# Patient Record
Sex: FEMALE | URBAN - METROPOLITAN AREA
[De-identification: names, ages, dates, MRNs, and addresses within clinical notes are randomized per-mention and may not be internally consistent; named-entity substitution may affect disease eponyms.]

---

## 2021-11-22 ENCOUNTER — HOSPITAL ENCOUNTER (OUTPATIENT)
Age: 76
Discharge: HOME | End: 2021-11-24

## 2021-11-22 PROCEDURE — 87081 CULTURE SCREEN ONLY: CPT

## 2021-11-24 LAB — ORGANISM: ABNORMAL

## 2025-04-04 ENCOUNTER — TRANSCRIBE ORDERS (OUTPATIENT)
Dept: ADMINISTRATIVE | Age: 80
End: 2025-04-04

## 2025-04-04 DIAGNOSIS — Z12.31 ENCOUNTER FOR SCREENING MAMMOGRAM FOR MALIGNANT NEOPLASM OF BREAST: Primary | ICD-10-CM

## 2025-06-19 ENCOUNTER — APPOINTMENT (OUTPATIENT)
Dept: RADIOLOGY | Facility: HOSPITAL | Age: 80
End: 2025-06-19
Payer: MEDICARE

## 2025-06-19 ENCOUNTER — HOSPITAL ENCOUNTER (OUTPATIENT)
Facility: HOSPITAL | Age: 80
Setting detail: OBSERVATION
Discharge: HOME | End: 2025-06-20
Attending: STUDENT IN AN ORGANIZED HEALTH CARE EDUCATION/TRAINING PROGRAM | Admitting: INTERNAL MEDICINE
Payer: MEDICARE

## 2025-06-19 ENCOUNTER — APPOINTMENT (OUTPATIENT)
Dept: CARDIOLOGY | Facility: HOSPITAL | Age: 80
End: 2025-06-19
Payer: MEDICARE

## 2025-06-19 DIAGNOSIS — R55 SYNCOPE, UNSPECIFIED SYNCOPE TYPE: Primary | ICD-10-CM

## 2025-06-19 DIAGNOSIS — R60.0 LEG EDEMA, LEFT: ICD-10-CM

## 2025-06-19 DIAGNOSIS — R55 SYNCOPE AND COLLAPSE: ICD-10-CM

## 2025-06-19 DIAGNOSIS — J34.9 DISORDER OF MAXILLARY SINUS: ICD-10-CM

## 2025-06-19 LAB
ALBUMIN SERPL BCP-MCNC: 4.5 G/DL (ref 3.4–5)
ALP SERPL-CCNC: 78 U/L (ref 33–136)
ALT SERPL W P-5'-P-CCNC: 11 U/L (ref 7–45)
ANION GAP SERPL CALC-SCNC: 14 MMOL/L (ref 10–20)
APPEARANCE UR: CLEAR
AST SERPL W P-5'-P-CCNC: 17 U/L (ref 9–39)
BASOPHILS # BLD AUTO: 0.03 X10*3/UL (ref 0–0.1)
BASOPHILS NFR BLD AUTO: 0.3 %
BILIRUB SERPL-MCNC: 0.4 MG/DL (ref 0–1.2)
BILIRUB UR STRIP.AUTO-MCNC: NEGATIVE MG/DL
BUN SERPL-MCNC: 21 MG/DL (ref 6–23)
CALCIUM SERPL-MCNC: 9.4 MG/DL (ref 8.6–10.3)
CARDIAC TROPONIN I PNL SERPL HS: 4 NG/L (ref 0–13)
CARDIAC TROPONIN I PNL SERPL HS: 5 NG/L (ref 0–13)
CHLORIDE SERPL-SCNC: 96 MMOL/L (ref 98–107)
CO2 SERPL-SCNC: 29 MMOL/L (ref 21–32)
COLOR UR: COLORLESS
CREAT SERPL-MCNC: 0.82 MG/DL (ref 0.5–1.05)
D DIMER PPP FEU-MCNC: 939 NG/ML FEU
EGFRCR SERPLBLD CKD-EPI 2021: 72 ML/MIN/1.73M*2
EOSINOPHIL # BLD AUTO: 0.06 X10*3/UL (ref 0–0.4)
EOSINOPHIL NFR BLD AUTO: 0.7 %
ERYTHROCYTE [DISTWIDTH] IN BLOOD BY AUTOMATED COUNT: 13.5 % (ref 11.5–14.5)
GLUCOSE SERPL-MCNC: 108 MG/DL (ref 74–99)
GLUCOSE UR STRIP.AUTO-MCNC: NORMAL MG/DL
HCT VFR BLD AUTO: 38.1 % (ref 36–46)
HGB BLD-MCNC: 12.5 G/DL (ref 12–16)
HOLD SPECIMEN: NORMAL
IMM GRANULOCYTES # BLD AUTO: 0.03 X10*3/UL (ref 0–0.5)
IMM GRANULOCYTES NFR BLD AUTO: 0.3 % (ref 0–0.9)
KETONES UR STRIP.AUTO-MCNC: NEGATIVE MG/DL
LEUKOCYTE ESTERASE UR QL STRIP.AUTO: NEGATIVE
LYMPHOCYTES # BLD AUTO: 1.13 X10*3/UL (ref 0.8–3)
LYMPHOCYTES NFR BLD AUTO: 12.8 %
MAGNESIUM SERPL-MCNC: 1.88 MG/DL (ref 1.6–2.4)
MCH RBC QN AUTO: 29.4 PG (ref 26–34)
MCHC RBC AUTO-ENTMCNC: 32.8 G/DL (ref 32–36)
MCV RBC AUTO: 90 FL (ref 80–100)
MONOCYTES # BLD AUTO: 0.55 X10*3/UL (ref 0.05–0.8)
MONOCYTES NFR BLD AUTO: 6.2 %
NEUTROPHILS # BLD AUTO: 7.05 X10*3/UL (ref 1.6–5.5)
NEUTROPHILS NFR BLD AUTO: 79.7 %
NITRITE UR QL STRIP.AUTO: NEGATIVE
NRBC BLD-RTO: 0 /100 WBCS (ref 0–0)
PH UR STRIP.AUTO: 5.5 [PH]
PLATELET # BLD AUTO: 217 X10*3/UL (ref 150–450)
POTASSIUM SERPL-SCNC: 3.6 MMOL/L (ref 3.5–5.3)
PROT SERPL-MCNC: 7.5 G/DL (ref 6.4–8.2)
PROT UR STRIP.AUTO-MCNC: NEGATIVE MG/DL
RBC # BLD AUTO: 4.25 X10*6/UL (ref 4–5.2)
RBC # UR STRIP.AUTO: ABNORMAL MG/DL
RBC #/AREA URNS AUTO: NORMAL /HPF
SODIUM SERPL-SCNC: 135 MMOL/L (ref 136–145)
SP GR UR STRIP.AUTO: 1.01
TSH SERPL-ACNC: 1.91 MIU/L (ref 0.44–3.98)
UROBILINOGEN UR STRIP.AUTO-MCNC: NORMAL MG/DL
WBC # BLD AUTO: 8.9 X10*3/UL (ref 4.4–11.3)
WBC #/AREA URNS AUTO: NORMAL /HPF

## 2025-06-19 PROCEDURE — 99285 EMERGENCY DEPT VISIT HI MDM: CPT | Mod: 25 | Performed by: STUDENT IN AN ORGANIZED HEALTH CARE EDUCATION/TRAINING PROGRAM

## 2025-06-19 PROCEDURE — 36415 COLL VENOUS BLD VENIPUNCTURE: CPT

## 2025-06-19 PROCEDURE — G0378 HOSPITAL OBSERVATION PER HR: HCPCS

## 2025-06-19 PROCEDURE — 84443 ASSAY THYROID STIM HORMONE: CPT

## 2025-06-19 PROCEDURE — 84484 ASSAY OF TROPONIN QUANT: CPT

## 2025-06-19 PROCEDURE — 71046 X-RAY EXAM CHEST 2 VIEWS: CPT

## 2025-06-19 PROCEDURE — 85379 FIBRIN DEGRADATION QUANT: CPT | Performed by: NURSE PRACTITIONER

## 2025-06-19 PROCEDURE — 2500000004 HC RX 250 GENERAL PHARMACY W/ HCPCS (ALT 636 FOR OP/ED): Performed by: NURSE PRACTITIONER

## 2025-06-19 PROCEDURE — 93005 ELECTROCARDIOGRAM TRACING: CPT

## 2025-06-19 PROCEDURE — 85025 COMPLETE CBC W/AUTO DIFF WBC: CPT

## 2025-06-19 PROCEDURE — 96372 THER/PROPH/DIAG INJ SC/IM: CPT | Performed by: NURSE PRACTITIONER

## 2025-06-19 PROCEDURE — 81001 URINALYSIS AUTO W/SCOPE: CPT

## 2025-06-19 PROCEDURE — 80053 COMPREHEN METABOLIC PANEL: CPT

## 2025-06-19 PROCEDURE — 72125 CT NECK SPINE W/O DYE: CPT | Performed by: STUDENT IN AN ORGANIZED HEALTH CARE EDUCATION/TRAINING PROGRAM

## 2025-06-19 PROCEDURE — 83735 ASSAY OF MAGNESIUM: CPT

## 2025-06-19 PROCEDURE — 72125 CT NECK SPINE W/O DYE: CPT

## 2025-06-19 PROCEDURE — 70450 CT HEAD/BRAIN W/O DYE: CPT | Performed by: STUDENT IN AN ORGANIZED HEALTH CARE EDUCATION/TRAINING PROGRAM

## 2025-06-19 PROCEDURE — 71046 X-RAY EXAM CHEST 2 VIEWS: CPT | Performed by: RADIOLOGY

## 2025-06-19 PROCEDURE — 99223 1ST HOSP IP/OBS HIGH 75: CPT | Performed by: NURSE PRACTITIONER

## 2025-06-19 PROCEDURE — 2500000001 HC RX 250 WO HCPCS SELF ADMINISTERED DRUGS (ALT 637 FOR MEDICARE OP): Performed by: NURSE PRACTITIONER

## 2025-06-19 PROCEDURE — 70450 CT HEAD/BRAIN W/O DYE: CPT

## 2025-06-19 RX ORDER — ACETAMINOPHEN 325 MG/1
650 TABLET ORAL EVERY 4 HOURS PRN
Status: DISCONTINUED | OUTPATIENT
Start: 2025-06-19 | End: 2025-06-20 | Stop reason: HOSPADM

## 2025-06-19 RX ORDER — OMEPRAZOLE 40 MG/1
40 CAPSULE, DELAYED RELEASE ORAL
COMMUNITY

## 2025-06-19 RX ORDER — PANTOPRAZOLE SODIUM 40 MG/1
40 TABLET, DELAYED RELEASE ORAL
Status: DISCONTINUED | OUTPATIENT
Start: 2025-06-20 | End: 2025-06-20 | Stop reason: HOSPADM

## 2025-06-19 RX ORDER — SERTRALINE HYDROCHLORIDE 50 MG/1
75 TABLET, FILM COATED ORAL NIGHTLY
COMMUNITY

## 2025-06-19 RX ORDER — SERTRALINE HYDROCHLORIDE 50 MG/1
75 TABLET, FILM COATED ORAL NIGHTLY
Status: DISCONTINUED | OUTPATIENT
Start: 2025-06-20 | End: 2025-06-20 | Stop reason: HOSPADM

## 2025-06-19 RX ORDER — FOSINOPRIL SODIUM AND HYDROCHLOROTHIAZIDE 10; 12.5 MG/1; MG/1
1 TABLET ORAL DAILY
COMMUNITY

## 2025-06-19 RX ORDER — TALC
3 POWDER (GRAM) TOPICAL NIGHTLY PRN
Status: DISCONTINUED | OUTPATIENT
Start: 2025-06-19 | End: 2025-06-20 | Stop reason: HOSPADM

## 2025-06-19 RX ORDER — SODIUM CHLORIDE 9 MG/ML
100 INJECTION, SOLUTION INTRAVENOUS CONTINUOUS
Status: DISCONTINUED | OUTPATIENT
Start: 2025-06-19 | End: 2025-06-20 | Stop reason: HOSPADM

## 2025-06-19 RX ORDER — ACETAMINOPHEN 160 MG/5ML
650 SOLUTION ORAL EVERY 4 HOURS PRN
Status: DISCONTINUED | OUTPATIENT
Start: 2025-06-19 | End: 2025-06-20 | Stop reason: HOSPADM

## 2025-06-19 RX ORDER — LATANOPROST 50 UG/ML
1 SOLUTION/ DROPS OPHTHALMIC NIGHTLY
Status: DISCONTINUED | OUTPATIENT
Start: 2025-06-20 | End: 2025-06-20 | Stop reason: HOSPADM

## 2025-06-19 RX ORDER — FOSINOPRIL SODIUM AND HYDROCHLOROTHIAZIDE 10; 12.5 MG/1; MG/1
1 TABLET ORAL DAILY
Status: DISCONTINUED | OUTPATIENT
Start: 2025-06-20 | End: 2025-06-19 | Stop reason: CLARIF

## 2025-06-19 RX ORDER — ACETAMINOPHEN 650 MG/1
650 SUPPOSITORY RECTAL EVERY 4 HOURS PRN
Status: DISCONTINUED | OUTPATIENT
Start: 2025-06-19 | End: 2025-06-20 | Stop reason: HOSPADM

## 2025-06-19 RX ORDER — SERTRALINE HYDROCHLORIDE 50 MG/1
75 TABLET, FILM COATED ORAL NIGHTLY
Status: DISCONTINUED | OUTPATIENT
Start: 2025-06-20 | End: 2025-06-19

## 2025-06-19 RX ORDER — ENOXAPARIN SODIUM 100 MG/ML
40 INJECTION SUBCUTANEOUS EVERY 24 HOURS
Status: DISCONTINUED | OUTPATIENT
Start: 2025-06-19 | End: 2025-06-20 | Stop reason: HOSPADM

## 2025-06-19 RX ORDER — POLYETHYLENE GLYCOL 3350 17 G/17G
17 POWDER, FOR SOLUTION ORAL DAILY
Status: DISCONTINUED | OUTPATIENT
Start: 2025-06-19 | End: 2025-06-20 | Stop reason: HOSPADM

## 2025-06-19 RX ORDER — LATANOPROST 50 UG/ML
1 SOLUTION/ DROPS OPHTHALMIC NIGHTLY
COMMUNITY

## 2025-06-19 RX ADMIN — SODIUM CHLORIDE 100 ML/HR: 0.9 INJECTION, SOLUTION INTRAVENOUS at 20:47

## 2025-06-19 RX ADMIN — ENOXAPARIN SODIUM 40 MG: 40 INJECTION SUBCUTANEOUS at 20:47

## 2025-06-19 RX ADMIN — ACETAMINOPHEN 650 MG: 325 TABLET, FILM COATED ORAL at 20:47

## 2025-06-19 SDOH — SOCIAL STABILITY: SOCIAL INSECURITY: WITHIN THE LAST YEAR, HAVE YOU BEEN AFRAID OF YOUR PARTNER OR EX-PARTNER?: NO

## 2025-06-19 SDOH — SOCIAL STABILITY: SOCIAL INSECURITY: HAS ANYONE EVER THREATENED TO HURT YOUR FAMILY OR YOUR PETS?: NO

## 2025-06-19 SDOH — ECONOMIC STABILITY: INCOME INSECURITY: IN THE PAST 12 MONTHS HAS THE ELECTRIC, GAS, OIL, OR WATER COMPANY THREATENED TO SHUT OFF SERVICES IN YOUR HOME?: NO

## 2025-06-19 SDOH — ECONOMIC STABILITY: FOOD INSECURITY: HOW HARD IS IT FOR YOU TO PAY FOR THE VERY BASICS LIKE FOOD, HOUSING, MEDICAL CARE, AND HEATING?: NOT HARD AT ALL

## 2025-06-19 SDOH — ECONOMIC STABILITY: HOUSING INSECURITY: IN THE LAST 12 MONTHS, WAS THERE A TIME WHEN YOU WERE NOT ABLE TO PAY THE MORTGAGE OR RENT ON TIME?: NO

## 2025-06-19 SDOH — ECONOMIC STABILITY: HOUSING INSECURITY: AT ANY TIME IN THE PAST 12 MONTHS, WERE YOU HOMELESS OR LIVING IN A SHELTER (INCLUDING NOW)?: NO

## 2025-06-19 SDOH — ECONOMIC STABILITY: FOOD INSECURITY: WITHIN THE PAST 12 MONTHS, YOU WORRIED THAT YOUR FOOD WOULD RUN OUT BEFORE YOU GOT THE MONEY TO BUY MORE.: NEVER TRUE

## 2025-06-19 SDOH — SOCIAL STABILITY: SOCIAL INSECURITY
WITHIN THE LAST YEAR, HAVE YOU BEEN KICKED, HIT, SLAPPED, OR OTHERWISE PHYSICALLY HURT BY YOUR PARTNER OR EX-PARTNER?: NO

## 2025-06-19 SDOH — ECONOMIC STABILITY: TRANSPORTATION INSECURITY: IN THE PAST 12 MONTHS, HAS LACK OF TRANSPORTATION KEPT YOU FROM MEDICAL APPOINTMENTS OR FROM GETTING MEDICATIONS?: NO

## 2025-06-19 SDOH — SOCIAL STABILITY: SOCIAL INSECURITY: DO YOU FEEL ANYONE HAS EXPLOITED OR TAKEN ADVANTAGE OF YOU FINANCIALLY OR OF YOUR PERSONAL PROPERTY?: NO

## 2025-06-19 SDOH — SOCIAL STABILITY: SOCIAL INSECURITY: WITHIN THE LAST YEAR, HAVE YOU BEEN HUMILIATED OR EMOTIONALLY ABUSED IN OTHER WAYS BY YOUR PARTNER OR EX-PARTNER?: NO

## 2025-06-19 SDOH — SOCIAL STABILITY: SOCIAL INSECURITY
WITHIN THE LAST YEAR, HAVE YOU BEEN RAPED OR FORCED TO HAVE ANY KIND OF SEXUAL ACTIVITY BY YOUR PARTNER OR EX-PARTNER?: NO

## 2025-06-19 SDOH — ECONOMIC STABILITY: HOUSING INSECURITY: IN THE PAST 12 MONTHS, HOW MANY TIMES HAVE YOU MOVED WHERE YOU WERE LIVING?: 0

## 2025-06-19 SDOH — ECONOMIC STABILITY: FOOD INSECURITY: WITHIN THE PAST 12 MONTHS, THE FOOD YOU BOUGHT JUST DIDN'T LAST AND YOU DIDN'T HAVE MONEY TO GET MORE.: NEVER TRUE

## 2025-06-19 SDOH — SOCIAL STABILITY: SOCIAL INSECURITY: ARE THERE ANY APPARENT SIGNS OF INJURIES/BEHAVIORS THAT COULD BE RELATED TO ABUSE/NEGLECT?: NO

## 2025-06-19 SDOH — SOCIAL STABILITY: SOCIAL INSECURITY: DOES ANYONE TRY TO KEEP YOU FROM HAVING/CONTACTING OTHER FRIENDS OR DOING THINGS OUTSIDE YOUR HOME?: NO

## 2025-06-19 SDOH — SOCIAL STABILITY: SOCIAL INSECURITY: HAVE YOU HAD THOUGHTS OF HARMING ANYONE ELSE?: NO

## 2025-06-19 SDOH — SOCIAL STABILITY: SOCIAL INSECURITY: DO YOU FEEL UNSAFE GOING BACK TO THE PLACE WHERE YOU ARE LIVING?: NO

## 2025-06-19 SDOH — SOCIAL STABILITY: SOCIAL INSECURITY: WERE YOU ABLE TO COMPLETE ALL THE BEHAVIORAL HEALTH SCREENINGS?: YES

## 2025-06-19 SDOH — SOCIAL STABILITY: SOCIAL INSECURITY: ABUSE: ADULT

## 2025-06-19 SDOH — SOCIAL STABILITY: SOCIAL INSECURITY: HAVE YOU HAD ANY THOUGHTS OF HARMING ANYONE ELSE?: NO

## 2025-06-19 SDOH — SOCIAL STABILITY: SOCIAL INSECURITY: ARE YOU OR HAVE YOU BEEN THREATENED OR ABUSED PHYSICALLY, EMOTIONALLY, OR SEXUALLY BY ANYONE?: NO

## 2025-06-19 ASSESSMENT — COGNITIVE AND FUNCTIONAL STATUS - GENERAL
MOBILITY SCORE: 24
MOBILITY SCORE: 24
DAILY ACTIVITIY SCORE: 24
PATIENT BASELINE BEDBOUND: NO
DAILY ACTIVITIY SCORE: 24

## 2025-06-19 ASSESSMENT — ACTIVITIES OF DAILY LIVING (ADL)
PATIENT'S MEMORY ADEQUATE TO SAFELY COMPLETE DAILY ACTIVITIES?: YES
DRESSING YOURSELF: INDEPENDENT
HEARING - LEFT EAR: FUNCTIONAL
ASSISTIVE_DEVICE: EYEGLASSES
FEEDING YOURSELF: INDEPENDENT
JUDGMENT_ADEQUATE_SAFELY_COMPLETE_DAILY_ACTIVITIES: YES
HEARING - RIGHT EAR: FUNCTIONAL
BATHING: INDEPENDENT
TOILETING: INDEPENDENT
GROOMING: INDEPENDENT
LACK_OF_TRANSPORTATION: NO
ADEQUATE_TO_COMPLETE_ADL: YES
WALKS IN HOME: INDEPENDENT

## 2025-06-19 ASSESSMENT — LIFESTYLE VARIABLES
AUDIT-C TOTAL SCORE: 1
HAVE YOU EVER FELT YOU SHOULD CUT DOWN ON YOUR DRINKING: NO
HOW OFTEN DO YOU HAVE 6 OR MORE DRINKS ON ONE OCCASION: NEVER
TOTAL SCORE: 0
EVER HAD A DRINK FIRST THING IN THE MORNING TO STEADY YOUR NERVES TO GET RID OF A HANGOVER: NO
AUDIT-C TOTAL SCORE: 1
SKIP TO QUESTIONS 9-10: 1
HOW OFTEN DO YOU HAVE A DRINK CONTAINING ALCOHOL: MONTHLY OR LESS
HOW MANY STANDARD DRINKS CONTAINING ALCOHOL DO YOU HAVE ON A TYPICAL DAY: 1 OR 2
EVER FELT BAD OR GUILTY ABOUT YOUR DRINKING: NO
HAVE PEOPLE ANNOYED YOU BY CRITICIZING YOUR DRINKING: NO

## 2025-06-19 ASSESSMENT — PATIENT HEALTH QUESTIONNAIRE - PHQ9
SUM OF ALL RESPONSES TO PHQ9 QUESTIONS 1 & 2: 0
1. LITTLE INTEREST OR PLEASURE IN DOING THINGS: NOT AT ALL
2. FEELING DOWN, DEPRESSED OR HOPELESS: NOT AT ALL

## 2025-06-19 ASSESSMENT — PAIN DESCRIPTION - ORIENTATION: ORIENTATION: LEFT

## 2025-06-19 ASSESSMENT — PAIN SCALES - GENERAL
PAINLEVEL_OUTOF10: 2
PAINLEVEL_OUTOF10: 2
PAINLEVEL_OUTOF10: 0 - NO PAIN
PAINLEVEL_OUTOF10: 0 - NO PAIN

## 2025-06-19 ASSESSMENT — PAIN DESCRIPTION - LOCATION
LOCATION: HIP
LOCATION: GENERALIZED

## 2025-06-19 ASSESSMENT — PAIN - FUNCTIONAL ASSESSMENT
PAIN_FUNCTIONAL_ASSESSMENT: 0-10

## 2025-06-19 ASSESSMENT — ENCOUNTER SYMPTOMS
LIGHT-HEADEDNESS: 1
NERVOUS/ANXIOUS: 1

## 2025-06-19 NOTE — H&P
"History Of Present Illness  Promise Mchugh is a 80 y.o. female with history of provoked PE/DVT, hypertension and osteoarthritis, who presented to the ED with anxiety and complaints of dizziness.  Patient reports that 4 days ago she had 2 margaritas, went to the restroom and fell to the floor.  She did not lose consciousness, but she had a hard time getting up.  This morning, she had 1 episode of dizziness for about 1 minute after going from a sitting to standing position.  She adds just feeling \"off\" and very anxious, so she came in.  The blood work is unremarkable.  CT spine shows: Nonspecific soft tissue attenuation within the right vallecula may retained secretions/debris although evaluation is limited on this noncontrast enhanced CT. ENT follow-up and consideration of nonemergent/outpatient contrast-enhanced CT of the cervical soft tissues is recommended to exclude oropharyngeal neoplasm.  CT head shows near complete opacification of the left maxillary sinus may relate to inspissated secretions versus fungal colonization.  EKG shows no acute ischemia.  The patient denies associated chest pain, shortness of breath, palpitations, dizziness, or headache.        Past Medical History  Hypertension  DVT and PE status post left TKR in 2005  Osteoarthritis    Surgical History  Left TKR     Social History  She reports that she has never smoked. She has never used smokeless tobacco. She reports current alcohol use. She reports that she does not use drugs.    Family History  Family History[1]     Allergies  Patient has no known allergies.    Review of Systems   Neurological:  Positive for syncope and light-headedness.   Psychiatric/Behavioral:  The patient is nervous/anxious.         Physical Exam  HENT:      Mouth/Throat:      Mouth: Mucous membranes are moist.   Eyes:      Extraocular Movements: Extraocular movements intact.   Cardiovascular:      Rate and Rhythm: Normal rate and regular rhythm.      Pulses: Normal " "pulses.      Heart sounds: Normal heart sounds.   Pulmonary:      Effort: Pulmonary effort is normal.      Breath sounds: Normal breath sounds.   Abdominal:      General: Abdomen is flat.      Palpations: Abdomen is soft.   Musculoskeletal:      Left lower leg: Edema present.   Skin:     General: Skin is warm and dry.   Neurological:      Mental Status: She is alert and oriented to person, place, and time.   Psychiatric:         Mood and Affect: Mood normal.         Behavior: Behavior normal.         Thought Content: Thought content normal.         Judgment: Judgment normal.          Last Recorded Vitals  Blood pressure 130/71, pulse 86, temperature 36.1 °C (97 °F), temperature source Skin, resp. rate 20, height 1.626 m (5' 4\"), weight 79.4 kg (175 lb), SpO2 98%.    Relevant Results        CBC and Auto Differential        Component Value Flag Ref Range Units Status    WBC 8.9      4.4 - 11.3 x10*3/uL Final    nRBC 0.0      0.0 - 0.0 /100 WBCs Final    RBC 4.25      4.00 - 5.20 x10*6/uL Final    Hemoglobin 12.5      12.0 - 16.0 g/dL Final    Hematocrit 38.1      36.0 - 46.0 % Final    MCV 90      80 - 100 fL Final    MCH 29.4      26.0 - 34.0 pg Final    MCHC 32.8      32.0 - 36.0 g/dL Final    RDW 13.5      11.5 - 14.5 % Final    Platelets 217      150 - 450 x10*3/uL Final    Neutrophils % 79.7      40.0 - 80.0 % Final    Immature Granulocytes %, Automated 0.3      0.0 - 0.9 % Final    Comment:    Immature Granulocyte Count (IG) includes promyelocytes, myelocytes and metamyelocytes but does not include bands. Percent differential counts (%) should be interpreted in the context of the absolute cell counts (cells/UL).    Lymphocytes % 12.8      13.0 - 44.0 % Final    Monocytes % 6.2      2.0 - 10.0 % Final    Eosinophils % 0.7      0.0 - 6.0 % Final    Basophils % 0.3      0.0 - 2.0 % Final    Neutrophils Absolute 7.05      1.60 - 5.50 x10*3/uL Final    Comment:    Percent differential counts (%) should be interpreted " in the context of the absolute cell counts (cells/uL).    Immature Granulocytes Absolute, Automated 0.03      0.00 - 0.50 x10*3/uL Final    Lymphocytes Absolute 1.13      0.80 - 3.00 x10*3/uL Final    Monocytes Absolute 0.55      0.05 - 0.80 x10*3/uL Final    Eosinophils Absolute 0.06      0.00 - 0.40 x10*3/uL Final    Basophils Absolute 0.03      0.00 - 0.10 x10*3/uL Final                  Comprehensive Metabolic Panel        Component Value Flag Ref Range Units Status    Glucose 108      74 - 99 mg/dL Final    Sodium 135      136 - 145 mmol/L Final    Potassium 3.6      3.5 - 5.3 mmol/L Final    Chloride 96      98 - 107 mmol/L Final    Bicarbonate 29      21 - 32 mmol/L Final    Anion Gap 14      10 - 20 mmol/L Final    Urea Nitrogen 21      6 - 23 mg/dL Final    Creatinine 0.82      0.50 - 1.05 mg/dL Final    eGFR 72      >60 mL/min/1.73m*2 Final    Comment:    Calculations of estimated GFR are performed using the 2021 CKD-EPI Study Refit equation without the race variable for the IDMS-Traceable creatinine methods.  https://jasn.asnjournals.org/content/early/2021/09/22/ASN.5514114366    Calcium 9.4      8.6 - 10.3 mg/dL Final    Albumin 4.5      3.4 - 5.0 g/dL Final    Alkaline Phosphatase 78      33 - 136 U/L Final    Total Protein 7.5      6.4 - 8.2 g/dL Final    AST 17      9 - 39 U/L Final    Bilirubin, Total 0.4      0.0 - 1.2 mg/dL Final    ALT 11      7 - 45 U/L Final    Comment:    Patients treated with Sulfasalazine may generate falsely decreased results for ALT.                  Magnesium        Component Value Flag Ref Range Units Status    Magnesium 1.88      1.60 - 2.40 mg/dL Final                  TSH with reflex to Free T4 if abnormal        Component Value Flag Ref Range Units Status    Thyroid Stimulating Hormone 1.91      0.44 - 3.98 mIU/L Final                  XR chest 2 views          Interpreted By:  Jae Horne,   STUDY:  XR CHEST 2 VIEWS; 6/19/2025 2:29 pm       INDICATION:  Signs/Symptoms:Syncope      COMPARISON:  None.      ACCESSION NUMBER(S):  [id number]      ORDERING CLINICIAN:  MARIANGEL WATERMAN      TECHNIQUE:  Number of films: Two-view radiographs of the chest were obtained.      FINDINGS:  The heart and mediastinum are normal.  The lungs are clear.  No pleural effusions are seen.  The osseous structures are unremarkable.      IMPRESSION:  Normal chest radiographs.      Signed by: Jae Horne 6/19/2025 2:45 PM  Dictation workstation:   [id number]         Troponin I, High Sensitivity, Initial        Component Value Flag Ref Range Units Status    Troponin I, High Sensitivity 5      0 - 13 ng/L Final                  Urinalysis with Reflex Culture and Microscopic        Component Value Flag Ref Range Units Status    Color, Urine Colorless   (Normal)   Light-Yellow, Yellow, Dark-Yellow  Final    Appearance, Urine Clear      Clear  Final    Specific Gravity, Urine 1.008      1.005 - 1.035  Final    pH, Urine 5.5      5.0, 5.5, 6.0, 6.5, 7.0, 7.5, 8.0  Final    Protein, Urine NEGATIVE      NEGATIVE, 10 (TRACE), 20 (TRACE) mg/dL Final    Glucose, Urine Normal      Normal mg/dL Final    Blood, Urine 0.2 (2+)      NEGATIVE mg/dL Final    Ketones, Urine NEGATIVE      NEGATIVE mg/dL Final    Bilirubin, Urine NEGATIVE      NEGATIVE mg/dL Final    Urobilinogen, Urine Normal      Normal mg/dL Final    Nitrite, Urine NEGATIVE      NEGATIVE  Final    Leukocyte Esterase, Urine NEGATIVE      NEGATIVE  Final                  Extra Urine Gray Tube        Component    Extra Tube                  Troponin, High Sensitivity, 1 Hour        Component Value Flag Ref Range Units Status    Troponin I, High Sensitivity 4      0 - 13 ng/L Final                  Urinalysis Microscopic        Component Value Flag Ref Range Units Status    WBC, Urine 1-5      1-5, NONE /HPF Final    RBC, Urine 1-2      NONE, 1-2, 3-5 /HPF Final                  CT head wo IV contrast          Interpreted By:   Pawel Lopez,   STUDY:  CT HEAD WO IV CONTRAST;  6/19/2025 3:27 pm      INDICATION:  Signs/Symptoms:fall with possible head strike.          COMPARISON:  None.      ACCESSION NUMBER(S):  [id number]      ORDERING CLINICIAN:  YOLI THOMAS      TECHNIQUE:  Noncontrast axial CT scan of head was performed. Angled reformats in  brain and bone windows were generated. The images were reviewed in  bone, brain, blood and soft tissue windows.      FINDINGS:  Calvarium: Calvarium is intact.      Paranasal sinuses and mastoids: Near-complete opacification of the  left maxillary sinus with mucoperiosteal thickening and heterogeneous  attenuation internal material which may relate to inspissated  secretions or fungal colonization.      Focal mucosal thickening versus debris within the right maxillary  sinus. Remaining visualized paranasal sinuses and mastoid air cells  are clear.      Parenchyma/CSF Spaces: No acute large vascular territory infarct. No  mass effect or midline shift. No acute intracranial hemorrhage. No  hydrocephalus. Patchy low-attenuation within the periventricular and  subcortical white matter commonly attributed to chronic microvascular  ischemia patients at this age.Global cerebral and cerebellar volume  loss with ex vacuo ventricular dilatation is age appropriate.  Intracranial atherosclerosis.              IMPRESSION:  No CT evidence of acute intracranial abnormality.      Chronic senescent changes including white matter disease commonly  attributed to chronic microvascular ischemia and age-appropriate  volume loss.      Near-complete opacification of the left maxillary sinus may relate to  inspissated secretions versus fungal colonization.      MACRO:  None      Signed by: Pawel Lopez 6/19/2025 3:49 PM  Dictation workstation:   [id number]         CT cervical spine wo IV contrast          Interpreted By:  Pawel Lopez,   STUDY:  CT CERVICAL SPINE WO IV CONTRAST;  6/19/2025 3:27 pm       INDICATION:  Signs/Symptoms:fall with possible head strike.          COMPARISON:  None.      ACCESSION NUMBER(S):  [id number]      ORDERING CLINICIAN:  YOLI THOMAS      TECHNIQUE:  Axial CT images of the cervical spine are obtained. Axial, coronal  and sagittal reconstructions are provided for review.      FINDINGS:  Vertebrae: No evidence of acute fracture of the cervical spine.  Vertebral body heights are maintained.      Vertebral Alignment: Cervical kyphosis centered at C5-C6.  Anterolisthesis of C4 on C5 measures 2 mm      Craniocervical Junction: The odontoid process and craniocervical  junction are intact.      Spinal canal: Grossly unremarkable.      Degenerative: Moderate C3-C4, and C5-C6 through T1-T2 intervertebral  disc height loss. Posterior disc osteophyte complexes  C3-C4, C5-C6,  C6-C7 and C7-T1 with uncovertebral spurring. Varying degrees of facet  hypertrophy moderate on the left and mild on the right at C2-C3, and  moderate on the right and mild on the left at C4-C5. Osseous fusion  across the right C3-C4 facet joints. Posterior calcified disc bulge  T1-T2. Mild-to-moderate spinal canal stenosis C5-C6.      Prevertebral/Paraspinal Soft Tissues: Unremarkable.      Additional: Intermediate attenuation soft tissue within the right  vallecula measuring 9 mm in diameter (series 303, image 45).      IMPRESSION:  No evidence of acute fracture of the cervical spine.      Anterolisthesis of C4 on C5 is age-indeterminate. While commonly  degenerative. Ligamentous injury can not be definitively excluded in  the setting of recent trauma at an MRI could be considered if there  is clinical concern.      Mild-to-moderate multilevel cervical spondylosis resulting in  mild-to-moderate spinal canal stenosis at C5-C6. No advanced bony  foraminal stenosis.      Nonspecific soft tissue attenuation within the right vallecula may  retained secretions/debris although evaluation is limited on this  noncontrast  enhanced CT. ENT follow-up and consideration of  nonemergent/outpatient contrast-enhanced CT of the cervical soft  tissues is recommended to exclude oropharyngeal neoplasm.          MACRO:  Critical Finding:  See findings. Notification was initiated on  6/19/2025 at 4:01 pm by  Pawel Lopez.  (**-YCF-**)      Signed by: Pawel Lopez 6/19/2025 4:01 PM  Dictation workstation:   [id number]              Assessment & Plan    Promise Mchugh is a 80 y.o. female with history of provoked PE/DVT, hypertension and osteoarthritis, who presented to the ED with anxiety and complaints of dizziness.      #Near syncope with fall  -Possibly due to alcohol intoxication  -Echocardiogram in the a.m.  -Telemetry monitoring tonight  -Orthostatic BP daily  -Monitor electrolytes  -Wells score 4.5   -D. Dimer added  -Cardiology consult    #LLE edema  #History of provoked DVT/PE  Will order ultrasound rule out DVT and consider CT PE  On Coumadin for 6 months, 20 years ago     #Anxiety  -Monitor for now    #Hypertension  -Continue fosinopril - hydrochlorothiazide  - Monitor BP    #Left maxillary sinus near complete opacification  - Follow-up with ENT outpatient    #DVT prophylaxis  -Lovenox subq        LUCIA Mcfarland-CNP         [1] No family history on file.

## 2025-06-19 NOTE — ED PROVIDER NOTES
"CC: Anxiety and Fatigue (On Tuesday went to a mexican restaurant and had 2 margaritas. Went to the bathroom and had a syncopal episode. Since then has felt \"off\" endorses anxiety and fatigue )     HPI:  Patient is a 80-year-old male with a past medical history of hypertension who presented to the ED for anxiety and syncope.  Patient noted that she had a syncopal episode 2 days before ED presentation.  Noted that she was drinking 2 margaritas that day before she went to the restroom.  Was noted to have a fall and was unsure of head strike.  Denied anticoagulation.  Notes that since then she has been feeling anxious about stressors in her life as well as about feeling \"off \".  States that she is under a lot of stress taking care of her daughter.  Is concerned that she may have another syncopal episode again.  Notes that she had an episode of lightheadedness today that lasted for approximately 1 minute.  Patient notes chronic neck pain but denies any change of her chronic neck pain.  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  Denied SI, HI, and AVH.  Denied chest pain, difficulty breathing, headache, abdominal pain, neck pain, back pain, dysuria, fevers, chills, cough, congestion, lower extremity edema, dysuria, and abnormal bowel movements.    Limitations to history: None  Independent historian(s): Patient  Records Reviewed: Recent available ED and inpatient notes reviewed in EMR.    PMHx/PSHx:  Per HPI.   - has no past medical history on file.  - has no past surgical history on file.    Medications:  Reviewed in EMR. See EMR for complete list of medications and doses.    Allergies:  Patient has no known allergies.    Social History:  - Tobacco:  reports that she has never smoked. She has never used smokeless tobacco.   - Alcohol:  reports current alcohol use.   - Illicit Drugs:  reports no history of drug use.     ROS:  Per HPI. "       ???????????????????????????????????????????????????????????????  Triage Vitals:  T 36.1 °C (97 °F)  HR 89  /67  RR 18  O2 98 %      Physical Exam  Vitals and nursing note reviewed.   Constitutional:       General: She is not in acute distress.  HENT:      Head: Normocephalic and atraumatic.      Nose: Nose normal.      Mouth/Throat:      Mouth: Mucous membranes are moist.   Eyes:      Conjunctiva/sclera: Conjunctivae normal.   Cardiovascular:      Rate and Rhythm: Normal rate and regular rhythm.      Pulses: Normal pulses.      Heart sounds: No murmur heard.  Pulmonary:      Effort: Pulmonary effort is normal. No respiratory distress.      Breath sounds: Normal breath sounds.   Abdominal:      Palpations: Abdomen is soft.      Tenderness: There is no abdominal tenderness.   Skin:     General: Skin is warm.   Neurological:      General: No focal deficit present.      Mental Status: She is alert.      Comments: Cranial nerves II through XII grossly intact.  Smile symmetric.  PERRL.  EOMI.  Finger-to-nose intact.  SI LT all 4 extremities.  Gait intact.           ???????????????????????????????????????????????????????????????  Labs:   Labs Reviewed   CBC WITH AUTO DIFFERENTIAL - Abnormal       Result Value    WBC 8.9      nRBC 0.0      RBC 4.25      Hemoglobin 12.5      Hematocrit 38.1      MCV 90      MCH 29.4      MCHC 32.8      RDW 13.5      Platelets 217      Neutrophils % 79.7      Immature Granulocytes %, Automated 0.3      Lymphocytes % 12.8      Monocytes % 6.2      Eosinophils % 0.7      Basophils % 0.3      Neutrophils Absolute 7.05 (*)     Immature Granulocytes Absolute, Automated 0.03      Lymphocytes Absolute 1.13      Monocytes Absolute 0.55      Eosinophils Absolute 0.06      Basophils Absolute 0.03     COMPREHENSIVE METABOLIC PANEL - Abnormal    Glucose 108 (*)     Sodium 135 (*)     Potassium 3.6      Chloride 96 (*)     Bicarbonate 29      Anion Gap 14      Urea Nitrogen 21       Creatinine 0.82      eGFR 72      Calcium 9.4      Albumin 4.5      Alkaline Phosphatase 78      Total Protein 7.5      AST 17      Bilirubin, Total 0.4      ALT 11     URINALYSIS WITH REFLEX CULTURE AND MICROSCOPIC - Abnormal    Color, Urine Colorless (*)     Appearance, Urine Clear      Specific Gravity, Urine 1.008      pH, Urine 5.5      Protein, Urine NEGATIVE      Glucose, Urine Normal      Blood, Urine 0.2 (2+) (*)     Ketones, Urine NEGATIVE      Bilirubin, Urine NEGATIVE      Urobilinogen, Urine Normal      Nitrite, Urine NEGATIVE      Leukocyte Esterase, Urine NEGATIVE     MAGNESIUM - Normal    Magnesium 1.88     TSH WITH REFLEX TO FREE T4 IF ABNORMAL - Normal    Thyroid Stimulating Hormone 1.91      Narrative:     TSH testing is performed using different testing methodology at [facility] than at other [facility]. Direct result comparisons should only be made within the same method.     SERIAL TROPONIN-INITIAL - Normal    Troponin I, High Sensitivity 5      Narrative:     Less than 99th percentile of normal range cutoff-  Female and children under 18 years old <14 ng/L; Male <21 ng/L: Negative  Repeat testing should be performed if clinically indicated.     Female and children under 18 years old 14-50 ng/L; Male 21-50 ng/L:  Consistent with possible cardiac damage and possible increased clinical   risk. Serial measurements may help to assess extent of myocardial damage.     >50 ng/L: Consistent with cardiac damage, increased clinical risk and  myocardial infarction. Serial measurements may help assess extent of   myocardial damage.      NOTE: Children less than 1 year old may have higher baseline troponin   levels and results should be interpreted in conjunction with the overall   clinical context.     NOTE: Troponin I testing is performed using a different   testing methodology at [facility] than at other   [facility]. Direct result comparisons should only   be made  within the same method.   SERIAL TROPONIN, 1 HOUR - Normal    Troponin I, High Sensitivity 4      Narrative:     Less than 99th percentile of normal range cutoff-  Female and children under 18 years old <14 ng/L; Male <21 ng/L: Negative  Repeat testing should be performed if clinically indicated.     Female and children under 18 years old 14-50 ng/L; Male 21-50 ng/L:  Consistent with possible cardiac damage and possible increased clinical   risk. Serial measurements may help to assess extent of myocardial damage.     >50 ng/L: Consistent with cardiac damage, increased clinical risk and  myocardial infarction. Serial measurements may help assess extent of   myocardial damage.      NOTE: Children less than 1 year old may have higher baseline troponin   levels and results should be interpreted in conjunction with the overall   clinical context.     NOTE: Troponin I testing is performed using a different   testing methodology at [facility] than at other   [facility]. Direct result comparisons should only   be made within the same method.   URINALYSIS MICROSCOPIC WITH REFLEX CULTURE - Normal    WBC, Urine 1-5      RBC, Urine 1-2     TROPONIN SERIES- (INITIAL, 1 HR)    Narrative:     The following orders were created for panel order Troponin I Series, High Sensitivity (0, 1 HR).  Procedure                               Abnormality         Status                     ---------                               -----------         ------                     Troponin I, High Sensiti...[[id number]]  Normal              Final result               Troponin, High Sensitivi...[[id number]]  Normal              Final result                 Please view results for these tests on the individual orders.   URINALYSIS WITH REFLEX CULTURE AND MICROSCOPIC    Narrative:     The following orders were created for panel order Urinalysis with Reflex Culture and Microscopic.  Procedure                               Abnormality          Status                     ---------                               -----------         ------                     Urinalysis with Reflex C...[[id number]]  Abnormal            Final result               Extra Urine Gray Tube[[id number]]                            Final result                 Please view results for these tests on the individual orders.   EXTRA URINE GRAY TUBE    Extra Tube            Imaging:   CT head wo IV contrast   Final Result   No CT evidence of acute intracranial abnormality.        Chronic senescent changes including white matter disease commonly   attributed to chronic microvascular ischemia and age-appropriate   volume loss.        Near-complete opacification of the left maxillary sinus may relate to   inspissated secretions versus fungal colonization.        MACRO:   None        Signed by: Pawel Lopez 6/19/2025 3:49 PM   Dictation workstation:   [id number]      CT cervical spine wo IV contrast   Final Result   No evidence of acute fracture of the cervical spine.        Anterolisthesis of C4 on C5 is age-indeterminate. While commonly   degenerative. Ligamentous injury can not be definitively excluded in   the setting of recent trauma at an MRI could be considered if there   is clinical concern.        Mild-to-moderate multilevel cervical spondylosis resulting in   mild-to-moderate spinal canal stenosis at C5-C6. No advanced bony   foraminal stenosis.        Nonspecific soft tissue attenuation within the right vallecula may   retained secretions/debris although evaluation is limited on this   noncontrast enhanced CT. ENT follow-up and consideration of   nonemergent/outpatient contrast-enhanced CT of the cervical soft   tissues is recommended to exclude oropharyngeal neoplasm.             MACRO:   Critical Finding:  See findings. Notification was initiated on   6/19/2025 at 4:01 pm by  Pawel Lopez.  (**-YCF-**)        Signed by: Pawel Lopez 6/19/2025 4:01 PM   Dictation workstation:    [id number]      XR chest 2 views   Final Result   Normal chest radiographs.        Signed by: Jae Horne 6/19/2025 2:45 PM   Dictation workstation:   [id number]           MDM:  Patient is a 80-year-old male with a past medical history of hypertension who presented to the ED for anxiety and syncope.  Patient presented HDS.  Physical exam finding significant for 80-year-old female in no acute distress with no focal cardiac or neurologic findings.  Low clinical concern for acute infectious process or acute aortic process including aortic dissection.  Low clinical concern for PE given that patient does not have chest pain, difficulty breathing, lower extremity edema, or tachycardia.  Cardiac workup, UA, CT head, and CT C-spine ordered.  Please see ED course and disposition for remainder of care.    ED Course:  ED Course as of 06/21/25 0055   Thu Jun 19, 2025   1550 XR chest 2 views  CXR without acute cardiopulmonary process. [MH]   1550 UA without findings concerning for UTI.  Local concern for ACS with unremarkable EKG and troponins.  TSH normal 1.91.  CBC and metabolic panel are unremarkable. [MH]   1608 CT cervical spine wo IV contrast  IMPRESSION:  No evidence of acute fracture of the cervical spine.      Anterolisthesis of C4 on C5 is age-indeterminate. While commonly  degenerative. Ligamentous injury can not be definitively excluded in  the setting of recent trauma at an MRI could be considered if there  is clinical concern.      Mild-to-moderate multilevel cervical spondylosis resulting in  mild-to-moderate spinal canal stenosis at C5-C6. No advanced bony  foraminal stenosis.      Nonspecific soft tissue attenuation within the right vallecula may  retained secretions/debris although evaluation is limited on this  noncontrast enhanced CT. ENT follow-up and consideration of  nonemergent/outpatient contrast-enhanced CT of the cervical soft  tissues is recommended to exclude oropharyngeal neoplasm.    Low  clinical concern for ligamentous injury.  Findings are likely chronic. []   1608 CT head wo IV contrast  IMPRESSION:  No CT evidence of acute intracranial abnormality.      Chronic senescent changes including white matter disease commonly  attributed to chronic microvascular ischemia and age-appropriate  volume loss.      Near-complete opacification of the left maxillary sinus may relate to  inspissated secretions versus fungal colonization.   []   1754 80-year-old presents to the emergency department with syncope on Tuesday.  Was stress shopping for her granddaughter's wedding when they went out to lunch and had to shelby as she went to the bathroom and she felt off.  She had episode where she syncopized.  No murmur appreciated on exam.  Neurologically intact today.  She states for the last 2 days she still felt off and therefore presented to the emergency department for evaluation.  Endorses extreme fatigue.  Patient has a remote history of DVT and PE 20 years ago after knee surgery.  It was provoked at that time.  She is not tachycardic or hypoxic.  She adamantly is denying chest pain and shortness of breath therefore lower suspicion of pulmonary embolism as the etiology of the syncope.  Will admit for further workup.  Discussed with patient who is agreeable to plan.  Patient notified of incidental findings on imaging. [HD]   2024 EKG: Rate is 80, sinus rhythm, normal axis, left anterior fascicular block, no st elevation or depression.  No previous EKG for comparison.  Review of EKG does not show any signs of STEMI, complete heart block, asystole, V-fib. []      ED Course User Index  [HD] Guillermina Douglas DO  [] Pawel Veras MD         Diagnoses as of 06/21/25 0055   Syncope, unspecified syncope type       Social Determinants Limiting Care:  None identified    Disposition:  Discussed ED findings and admission with the patient.  Patient stated understanding and agreement the plan.  All questions were  answered.  Discussed patient presentation with admitting team.  Patient mated to medicine in stable condition.    Pawel Veras MD   Emergency Medicine PGY-3  [facility]    Comment: Please note this report has been produced using speech recognition software and may contain errors related to that system including errors in grammar, punctuation, and spelling as well as words and phrases that may be inappropriate.  If there are any questions or concerns please feel free to contact the dictating provider for clarification.    Procedures ? Smart[employer] last updated 6/19/2025 4:39 PM        Pawel Veras MD  Resident  06/21/25 0056

## 2025-06-19 NOTE — PROGRESS NOTES
Pharmacy Medication History Review    Promise Mchugh is a 80 y.o. female admitted for Syncope and collapse. Pharmacy reviewed the patient's [id number] medications and allergies for accuracy.    The list below reflectives the updated PTA list. Please review each medication in order reconciliation for additional clarification and justification.  Prior to Admission Medications   Prescriptions Last Dose Informant Patient Reported? Taking?   fosinopriL-hydrochlorothiazide (Monopril-HCT) 10-12.5 mg tablet 6/19/2025 Morning Self Yes Yes   Sig: Take 1 tablet by mouth once daily.   latanoprost (Xalatan) 0.005 % ophthalmic solution 6/18/2025 Bedtime Self Yes Yes   Sig: Administer 1 drop into both eyes once daily at bedtime.   omeprazole (PriLOSEC) 40 mg DR capsule 6/19/2025 Morning Self Yes Yes   Sig: Take 1 capsule (40 mg) by mouth. Do not crush or chew.   sertraline (Zoloft) 50 mg tablet 6/18/2025 Bedtime Self Yes Yes   Sig: Take 1.5 tablets (75 mg) by mouth once daily at bedtime.      Facility-Administered Medications: None            The list below reflectives the updated allergy list. Please review each documented allergy for additional clarification and justification.  Allergies  Reviewed by Dyllan Mendoza on 6/19/2025   No Known Allergies         Below are additional concerns with the patient's PTA list.      DYLLAN MENDOZA

## 2025-06-19 NOTE — ED TRIAGE NOTES
"Patient here for anxiety and fatigue On Tuesday went to a mexican restaurant and had 2 margaritas. Went to the bathroom and had a syncopal episode. Since then has felt \"off\" endorses anxiety and fatigue   "

## 2025-06-19 NOTE — CARE PLAN
The patient's goals for the shift include      The clinical goals for the shift include No syncopal episodes throughout shift      Problem: Pain - Adult  Goal: Verbalizes/displays adequate comfort level or baseline comfort level  Outcome: Progressing     Problem: Safety - Adult  Goal: Free from fall injury  Outcome: Progressing     Problem: Chronic Conditions and Co-morbidities  Goal: Patient's chronic conditions and co-morbidity symptoms are monitored and maintained or improved  Outcome: Progressing     Problem: Nutrition  Goal: Nutrient intake appropriate for maintaining nutritional needs  Outcome: Progressing     Problem: Fall/Injury  Goal: Not fall by end of shift  Outcome: Progressing

## 2025-06-20 ENCOUNTER — APPOINTMENT (OUTPATIENT)
Dept: CARDIOLOGY | Facility: HOSPITAL | Age: 80
End: 2025-06-20
Payer: MEDICARE

## 2025-06-20 ENCOUNTER — APPOINTMENT (OUTPATIENT)
Dept: RADIOLOGY | Facility: HOSPITAL | Age: 80
End: 2025-06-20
Payer: MEDICARE

## 2025-06-20 ENCOUNTER — APPOINTMENT (OUTPATIENT)
Dept: VASCULAR MEDICINE | Facility: HOSPITAL | Age: 80
End: 2025-06-20
Payer: MEDICARE

## 2025-06-20 VITALS
OXYGEN SATURATION: 96 % | HEART RATE: 84 BPM | TEMPERATURE: 97.5 F | DIASTOLIC BLOOD PRESSURE: 66 MMHG | WEIGHT: 177 LBS | SYSTOLIC BLOOD PRESSURE: 157 MMHG | BODY MASS INDEX: 30.22 KG/M2 | RESPIRATION RATE: 16 BRPM | HEIGHT: 64 IN

## 2025-06-20 PROBLEM — R55 SYNCOPE AND COLLAPSE: Status: RESOLVED | Noted: 2025-06-19 | Resolved: 2025-06-20

## 2025-06-20 LAB
ANION GAP SERPL CALC-SCNC: 10 MMOL/L (ref 10–20)
AORTIC VALVE MEAN GRADIENT: 3 MMHG
AORTIC VALVE PEAK VELOCITY: 1.23 M/S
AV PEAK GRADIENT: 6 MMHG
AVA (PEAK VEL): 2.05 CM2
AVA (VTI): 2.15 CM2
BUN SERPL-MCNC: 16 MG/DL (ref 6–23)
CALCIUM SERPL-MCNC: 8.5 MG/DL (ref 8.6–10.3)
CHLORIDE SERPL-SCNC: 100 MMOL/L (ref 98–107)
CO2 SERPL-SCNC: 28 MMOL/L (ref 21–32)
CREAT SERPL-MCNC: 0.77 MG/DL (ref 0.5–1.05)
EGFRCR SERPLBLD CKD-EPI 2021: 77 ML/MIN/1.73M*2
EJECTION FRACTION APICAL 4 CHAMBER: 51
EJECTION FRACTION: 63 %
ERYTHROCYTE [DISTWIDTH] IN BLOOD BY AUTOMATED COUNT: 13.6 % (ref 11.5–14.5)
GLUCOSE SERPL-MCNC: 93 MG/DL (ref 74–99)
HCT VFR BLD AUTO: 34.1 % (ref 36–46)
HGB BLD-MCNC: 10.9 G/DL (ref 12–16)
LEFT ATRIUM VOLUME AREA LENGTH INDEX BSA: 16.2 ML/M2
LEFT VENTRICULAR OUTFLOW TRACT DIAMETER: 1.9 CM
MCH RBC QN AUTO: 28.3 PG (ref 26–34)
MCHC RBC AUTO-ENTMCNC: 32 G/DL (ref 32–36)
MCV RBC AUTO: 89 FL (ref 80–100)
MITRAL VALVE E/A RATIO: 0.57
NRBC BLD-RTO: 0 /100 WBCS (ref 0–0)
PLATELET # BLD AUTO: 180 X10*3/UL (ref 150–450)
POTASSIUM SERPL-SCNC: 3.2 MMOL/L (ref 3.5–5.3)
RBC # BLD AUTO: 3.85 X10*6/UL (ref 4–5.2)
RIGHT VENTRICLE FREE WALL PEAK S': 9 CM/S
SODIUM SERPL-SCNC: 135 MMOL/L (ref 136–145)
TRICUSPID ANNULAR PLANE SYSTOLIC EXCURSION: 1.9 CM
WBC # BLD AUTO: 4.3 X10*3/UL (ref 4.4–11.3)

## 2025-06-20 PROCEDURE — 99239 HOSP IP/OBS DSCHRG MGMT >30: CPT | Performed by: NURSE PRACTITIONER

## 2025-06-20 PROCEDURE — 93306 TTE W/DOPPLER COMPLETE: CPT

## 2025-06-20 PROCEDURE — 71275 CT ANGIOGRAPHY CHEST: CPT | Performed by: RADIOLOGY

## 2025-06-20 PROCEDURE — 80048 BASIC METABOLIC PNL TOTAL CA: CPT | Performed by: NURSE PRACTITIONER

## 2025-06-20 PROCEDURE — 2500000002 HC RX 250 W HCPCS SELF ADMINISTERED DRUGS (ALT 637 FOR MEDICARE OP, ALT 636 FOR OP/ED): Performed by: NURSE PRACTITIONER

## 2025-06-20 PROCEDURE — 36415 COLL VENOUS BLD VENIPUNCTURE: CPT | Performed by: NURSE PRACTITIONER

## 2025-06-20 PROCEDURE — 2500000005 HC RX 250 GENERAL PHARMACY W/O HCPCS: Performed by: NURSE PRACTITIONER

## 2025-06-20 PROCEDURE — 2550000001 HC RX 255 CONTRASTS: Performed by: INTERNAL MEDICINE

## 2025-06-20 PROCEDURE — 93005 ELECTROCARDIOGRAM TRACING: CPT

## 2025-06-20 PROCEDURE — 71275 CT ANGIOGRAPHY CHEST: CPT

## 2025-06-20 PROCEDURE — G0378 HOSPITAL OBSERVATION PER HR: HCPCS

## 2025-06-20 PROCEDURE — 93971 EXTREMITY STUDY: CPT

## 2025-06-20 PROCEDURE — 93971 EXTREMITY STUDY: CPT | Performed by: INTERNAL MEDICINE

## 2025-06-20 PROCEDURE — 85027 COMPLETE CBC AUTOMATED: CPT | Performed by: NURSE PRACTITIONER

## 2025-06-20 PROCEDURE — 2500000002 HC RX 250 W HCPCS SELF ADMINISTERED DRUGS (ALT 637 FOR MEDICARE OP, ALT 636 FOR OP/ED): Performed by: INTERNAL MEDICINE

## 2025-06-20 PROCEDURE — 2500000001 HC RX 250 WO HCPCS SELF ADMINISTERED DRUGS (ALT 637 FOR MEDICARE OP): Performed by: INTERNAL MEDICINE

## 2025-06-20 RX ORDER — POTASSIUM CHLORIDE 20 MEQ/1
40 TABLET, EXTENDED RELEASE ORAL ONCE
Status: COMPLETED | OUTPATIENT
Start: 2025-06-20 | End: 2025-06-20

## 2025-06-20 RX ADMIN — LATANOPROST 1 DROP: 50 SOLUTION OPHTHALMIC at 00:05

## 2025-06-20 RX ADMIN — PANTOPRAZOLE SODIUM 40 MG: 40 TABLET, DELAYED RELEASE ORAL at 08:26

## 2025-06-20 RX ADMIN — Medication 3 MG: at 00:05

## 2025-06-20 RX ADMIN — IOHEXOL 75 ML: 350 INJECTION, SOLUTION INTRAVENOUS at 10:57

## 2025-06-20 RX ADMIN — SERTRALINE HYDROCHLORIDE 75 MG: 50 TABLET ORAL at 00:05

## 2025-06-20 RX ADMIN — HYDROCHLOROTHIAZIDE: 25 TABLET ORAL at 08:26

## 2025-06-20 RX ADMIN — POTASSIUM CHLORIDE 40 MEQ: 1500 TABLET, EXTENDED RELEASE ORAL at 11:25

## 2025-06-20 ASSESSMENT — PAIN - FUNCTIONAL ASSESSMENT: PAIN_FUNCTIONAL_ASSESSMENT: 0-10

## 2025-06-20 ASSESSMENT — ENCOUNTER SYMPTOMS
DIZZINESS: 0
ABDOMINAL PAIN: 0
WEAKNESS: 1
FEVER: 0
CHILLS: 0
COUGH: 0
FATIGUE: 1
PALPITATIONS: 0
SHORTNESS OF BREATH: 0
ABDOMINAL DISTENTION: 0

## 2025-06-20 ASSESSMENT — COGNITIVE AND FUNCTIONAL STATUS - GENERAL
MOBILITY SCORE: 24
DAILY ACTIVITIY SCORE: 24

## 2025-06-20 ASSESSMENT — PAIN SCALES - GENERAL: PAINLEVEL_OUTOF10: 0 - NO PAIN

## 2025-06-20 ASSESSMENT — ACTIVITIES OF DAILY LIVING (ADL): LACK_OF_TRANSPORTATION: NO

## 2025-06-20 NOTE — DISCHARGE INSTRUCTIONS
Nothing to eat after midnight the night prior to stress test. No caffeine 24 hours prior to stress test. Wear comfortable clothes and tennis shoes.

## 2025-06-20 NOTE — PROGRESS NOTES
06/20/25 1153   Discharge Planning   Living Arrangements Alone   Support Systems Children   Assistance Needed Alert and oriented x 4, Independent with ADL's, Drives, No DME used, Grab bars in the bathroom, Room air at baseline and currently, PCP-Dr.Michael Devin Middleton MD   Type of Residence Private residence  (Condo)   Number of Stairs to Enter Residence 3   Number of Stairs Within Residence 15  (15 steps to basement)   Do you have animals or pets at home? No   Who is requesting discharge planning? Provider   Home or Post Acute Services None   Expected Discharge Disposition Home   Does the patient need discharge transport arranged? No   Financial Resource Strain   How hard is it for you to pay for the very basics like food, housing, medical care, and heating? Not hard   Housing Stability   In the last 12 months, was there a time when you were not able to pay the mortgage or rent on time? N   In the past 12 months, how many times have you moved where you were living? 0   At any time in the past 12 months, were you homeless or living in a shelter (including now)? N   Transportation Needs   In the past 12 months, has lack of transportation kept you from medical appointments or from getting medications? no   In the past 12 months, has lack of transportation kept you from meetings, work, or from getting things needed for daily living? No   Patient Choice   Provider Choice list and CMS website (https://medicare.gov/care-compare#search) for post-acute Quality and Resource Measure Data were provided and reviewed with: Patient   Patient / Family choosing to utilize agency / facility established prior to hospitalization No   Stroke Family Assessment   Stroke Family Assessment Needed No   Intensity of Service   Intensity of Service 0-30 min

## 2025-06-20 NOTE — NURSING NOTE
Discharge instructions reviewed with patient and family. No questions at this time. No new home medications. Handout on syncope given. Telemetry removed and left at nurses station, masimo removed and left at bedside. IV removed. Discharged home in stable condition.

## 2025-06-20 NOTE — CONSULTS
"Inpatient consult to Cardiology  Consult performed by: Mayra Mart, LUCIA-CNP  Consult ordered by: LUCIA Mcfarland-CNP  Reason for consult: syncope          History Of Present Illness  Promise Mchugh is a 80 y.o. female presenting with pre syncope. She states she went shopping with her family and then they went to eat and she had 2 margaritas. She got up to go to the bathroom and when she was washing her hands, she got weak and went down to her knees. She tried pulling herself back up but could not. She does not believe she passed out. Since then, she felt \"not right\" and came to the ER. She denies any chest pain, shortness of breath, palpitations, or dizziness. She states she has drank 2 margaritas before and never felt like that.     Lab work in the ER showed glucose 108, sodium 135, potassium 3.6, BUN/Cr 21/0.82, troponin negative, magnesium 1.88, TSH 1.91, WBC 8.9, H&H 12.5/38.1, CT head negative for acute process, UA negative, CXR negative.    EKG showed SR with LAFB.       Past Medical History  Medical History[1]    Surgical History  Surgical History[2]     Social History  She reports that she has never smoked. She has never used smokeless tobacco. She reports current alcohol use. She reports that she does not use drugs.    Family History  Family History[3]     Allergies  Patient has no known allergies.    Review of Systems  Review of Systems   Constitutional:  Positive for fatigue. Negative for chills and fever.   Respiratory:  Negative for cough and shortness of breath.    Cardiovascular:  Negative for chest pain, palpitations and leg swelling.   Gastrointestinal:  Negative for abdominal distention and abdominal pain.   Musculoskeletal:  Negative for gait problem.   Neurological:  Positive for weakness. Negative for dizziness.   All other systems reviewed and are negative.         Physical Exam  Constitutional: Well developed, awake/alert/oriented x3, no distress, alert and cooperative  Eyes: ALICIA, " "EOMI, clear sclera  ENMT: mucous membranes moist, no apparent injury, no lesions seen  Head/Neck: Neck supple, no apparent injury, thyroid without mass or tenderness, No JVD, trachea midline, no bruits  Respiratory/Thorax: Patent airways, CTAB, normal breath sounds with good chest expansion, thorax symmetric  Cardiovascular: Regular, rate and rhythm, no murmurs, 2+ equal pulses of the extremities, normal S 1and S 2  Gastrointestinal: Nondistended, soft, non-tender, no rebound tenderness or guarding, no masses palpable, no organomegaly, +BS, no bruits  Musculoskeletal: ROM intact, no joint swelling, normal strength  Extremities: normal extremities, no cyanosis edema, contusions or wounds, no clubbing  Neurological: alert and oriented x3, intact senses, motor, response and reflexes, normal strength  Lymphatic: No significant lymphadenopathy  Psychological: Appropriate mood and behavior  Skin: Warm and dry, no lesions, no rashes       Last Recorded Vitals  Blood pressure 157/66, pulse 84, temperature 36.4 °C (97.5 °F), resp. rate 16, height 1.626 m (5' 4\"), weight 80.3 kg (177 lb), SpO2 96%.    Medications  Scheduled medications  Scheduled Medications[4]  Continuous medications  Continuous Medications[5]  PRN medications  PRN Medications[6]    Relevant Results  Results for orders placed or performed during the hospital encounter of 06/19/25 (from the past 24 hours)   CBC and Auto Differential   Result Value Ref Range    WBC 8.9 4.4 - 11.3 x10*3/uL    nRBC 0.0 0.0 - 0.0 /100 WBCs    RBC 4.25 4.00 - 5.20 x10*6/uL    Hemoglobin 12.5 12.0 - 16.0 g/dL    Hematocrit 38.1 36.0 - 46.0 %    MCV 90 80 - 100 fL    MCH 29.4 26.0 - 34.0 pg    MCHC 32.8 32.0 - 36.0 g/dL    RDW 13.5 11.5 - 14.5 %    Platelets 217 150 - 450 x10*3/uL    Neutrophils % 79.7 40.0 - 80.0 %    Immature Granulocytes %, Automated 0.3 0.0 - 0.9 %    Lymphocytes % 12.8 13.0 - 44.0 %    Monocytes % 6.2 2.0 - 10.0 %    Eosinophils % 0.7 0.0 - 6.0 %    Basophils % " 0.3 0.0 - 2.0 %    Neutrophils Absolute 7.05 (H) 1.60 - 5.50 x10*3/uL    Immature Granulocytes Absolute, Automated 0.03 0.00 - 0.50 x10*3/uL    Lymphocytes Absolute 1.13 0.80 - 3.00 x10*3/uL    Monocytes Absolute 0.55 0.05 - 0.80 x10*3/uL    Eosinophils Absolute 0.06 0.00 - 0.40 x10*3/uL    Basophils Absolute 0.03 0.00 - 0.10 x10*3/uL   Comprehensive Metabolic Panel   Result Value Ref Range    Glucose 108 (H) 74 - 99 mg/dL    Sodium 135 (L) 136 - 145 mmol/L    Potassium 3.6 3.5 - 5.3 mmol/L    Chloride 96 (L) 98 - 107 mmol/L    Bicarbonate 29 21 - 32 mmol/L    Anion Gap 14 10 - 20 mmol/L    Urea Nitrogen 21 6 - 23 mg/dL    Creatinine 0.82 0.50 - 1.05 mg/dL    eGFR 72 >60 mL/min/1.73m*2    Calcium 9.4 8.6 - 10.3 mg/dL    Albumin 4.5 3.4 - 5.0 g/dL    Alkaline Phosphatase 78 33 - 136 U/L    Total Protein 7.5 6.4 - 8.2 g/dL    AST 17 9 - 39 U/L    Bilirubin, Total 0.4 0.0 - 1.2 mg/dL    ALT 11 7 - 45 U/L   Magnesium   Result Value Ref Range    Magnesium 1.88 1.60 - 2.40 mg/dL   TSH with reflex to Free T4 if abnormal   Result Value Ref Range    Thyroid Stimulating Hormone 1.91 0.44 - 3.98 mIU/L   Troponin I, High Sensitivity, Initial   Result Value Ref Range    Troponin I, High Sensitivity 5 0 - 13 ng/L   Urinalysis with Reflex Culture and Microscopic   Result Value Ref Range    Color, Urine Colorless (N) Light-Yellow, Yellow, Dark-Yellow    Appearance, Urine Clear Clear    Specific Gravity, Urine 1.008 1.005 - 1.035    pH, Urine 5.5 5.0, 5.5, 6.0, 6.5, 7.0, 7.5, 8.0    Protein, Urine NEGATIVE NEGATIVE, 10 (TRACE), 20 (TRACE) mg/dL    Glucose, Urine Normal Normal mg/dL    Blood, Urine 0.2 (2+) (A) NEGATIVE mg/dL    Ketones, Urine NEGATIVE NEGATIVE mg/dL    Bilirubin, Urine NEGATIVE NEGATIVE mg/dL    Urobilinogen, Urine Normal Normal mg/dL    Nitrite, Urine NEGATIVE NEGATIVE    Leukocyte Esterase, Urine NEGATIVE NEGATIVE   Extra Urine Gray Tube   Result Value Ref Range    Extra Tube     Urinalysis Microscopic   Result  Value Ref Range    WBC, Urine 1-5 1-5, NONE /HPF    RBC, Urine 1-2 NONE, 1-2, 3-5 /HPF   Troponin, High Sensitivity, 1 Hour   Result Value Ref Range    Troponin I, High Sensitivity 4 0 - 13 ng/L   D-dimer, VTE Exclusion   Result Value Ref Range    D-Dimer, Quantitative VTE Exclusion 939 (H) <=500 ng/mL FEU   CBC   Result Value Ref Range    WBC 4.3 (L) 4.4 - 11.3 x10*3/uL    nRBC 0.0 0.0 - 0.0 /100 WBCs    RBC 3.85 (L) 4.00 - 5.20 x10*6/uL    Hemoglobin 10.9 (L) 12.0 - 16.0 g/dL    Hematocrit 34.1 (L) 36.0 - 46.0 %    MCV 89 80 - 100 fL    MCH 28.3 26.0 - 34.0 pg    MCHC 32.0 32.0 - 36.0 g/dL    RDW 13.6 11.5 - 14.5 %    Platelets 180 150 - 450 x10*3/uL   Basic metabolic panel   Result Value Ref Range    Glucose 93 74 - 99 mg/dL    Sodium 135 (L) 136 - 145 mmol/L    Potassium 3.2 (L) 3.5 - 5.3 mmol/L    Chloride 100 98 - 107 mmol/L    Bicarbonate 28 21 - 32 mmol/L    Anion Gap 10 10 - 20 mmol/L    Urea Nitrogen 16 6 - 23 mg/dL    Creatinine 0.77 0.50 - 1.05 mg/dL    eGFR 77 >60 mL/min/1.73m*2    Calcium 8.5 (L) 8.6 - 10.3 mg/dL   Transthoracic Echo Complete   Result Value Ref Range    BSA 1.9 m2       Transthoracic Echo Complete         Lower extremity venous duplex left   Final Result      CT head wo IV contrast   Final Result   No CT evidence of acute intracranial abnormality.        Chronic senescent changes including white matter disease commonly   attributed to chronic microvascular ischemia and age-appropriate   volume loss.        Near-complete opacification of the left maxillary sinus may relate to   inspissated secretions versus fungal colonization.        MACRO:   None        Signed by: Pawel Lopez 6/19/2025 3:49 PM   Dictation workstation:   [id number]      CT cervical spine wo IV contrast   Final Result   No evidence of acute fracture of the cervical spine.        Anterolisthesis of C4 on C5 is age-indeterminate. While commonly   degenerative. Ligamentous injury can not be definitively excluded in    the setting of recent trauma at an MRI could be considered if there   is clinical concern.        Mild-to-moderate multilevel cervical spondylosis resulting in   mild-to-moderate spinal canal stenosis at C5-C6. No advanced bony   foraminal stenosis.        Nonspecific soft tissue attenuation within the right vallecula may   retained secretions/debris although evaluation is limited on this   noncontrast enhanced CT. ENT follow-up and consideration of   nonemergent/outpatient contrast-enhanced CT of the cervical soft   tissues is recommended to exclude oropharyngeal neoplasm.             MACRO:   Critical Finding:  See findings. Notification was initiated on   6/19/2025 at 4:01 pm by  Pawel Lopez.  (**-YCF-**)        Signed by: Pawel Lopez 6/19/2025 4:01 PM   Dictation workstation:   [id number]      XR chest 2 views   Final Result   Normal chest radiographs.        Signed by: Jae Horne 6/19/2025 2:45 PM   Dictation workstation:   [id number]      CT angio chest for pulmonary embolism    (Results Pending)       No echocardiogram results found for the past 12 months       Assessment/Plan     Pre syncope, weakness  -CT head negative for acute process  -Orthostatic VS negative  -EKG showed SR with LAFB  -troponin negative  -Telemetry reviewed, no arrhythmias, bradycardia, or pauses  -UA negative  -Check echo  -D dimer elevated  -CTA pending  -Outpt stress test  -Outpt ambulatory monitor    2. Hypertension  -stable  -2gm na diet  -cont meds  -monitor    3. Hypokalemia  -supplement  -monitor      LUCIA Lovell-CNP         [1]   Past Medical History:  Diagnosis Date    Deep vein thrombosis (Multi)     Hypertension     Osteoarthritis     Pulmonary embolism    [2] History reviewed. No pertinent surgical history.  [3] No family history on file.  [4] enoxaparin, 40 mg, subcutaneous, q24h  latanoprost, 1 drop, Both Eyes, Nightly  lisinopril 10 mg, hydroCHLOROthiazide 12.5 mg for Zestoretic/Prinizide, ,  oral, Daily  pantoprazole, 40 mg, oral, Daily before breakfast  perflutren lipid microspheres, 0.5-10 mL of dilution, intravenous, Once in imaging  perflutren protein A microsphere, 0.5 mL, intravenous, Once in imaging  polyethylene glycol, 17 g, oral, Daily  sertraline, 75 mg, oral, Nightly  sulfur hexafluoride microsphr, 2 mL, intravenous, Once in imaging     [5] sodium chloride 0.9%, 100 mL/hr, Last Rate: 100 mL/hr (06/20/25 0005)     [6] PRN medications: acetaminophen **OR** acetaminophen **OR** acetaminophen, melatonin

## 2025-06-22 NOTE — DISCHARGE SUMMARY
Discharge Diagnosis  Syncope and collapse           Issues Requiring Follow-Up  Holter monitor OP    Discharge Meds     Medication List      CONTINUE taking these medications     fosinopriL-hydrochlorothiazide 10-12.5 mg tablet; Commonly known as:   Monopril-HCT   latanoprost 0.005 % ophthalmic solution; Commonly known as: Xalatan   omeprazole 40 mg DR capsule; Commonly known as: PriLOSEC   sertraline 50 mg tablet; Commonly known as: Zoloft       Test Results Pending At Discharge  Pending Labs       No current pending labs.            Hospital Course   Promise Mchugh is a 80 y.o. female with history of provoked PE/DVT, hypertension and osteoarthritis, who presented to the ED with anxiety and complaints of dizziness.     #Near syncope with fall  -Possibly due to alcohol intoxication  -Echocardiogram shows EF 60-65%, with abnormal pattern of left ventricular diastolic filling.   -Orthostatic BP negative  -Wells score 4.5   -CT A negative for PE  -Discharging to cardiology for Holter monitor placement. OP stress testing to be scheduled by cardiology.   #LLE edema  #History of provoked DVT/PE  US negative for DVT  On Coumadin for 6 months, 20 years ago   #Anxiety- resolved  #Left maxillary sinus near complete opacification  - Follow-up with ENT outpatient; adding referral      Discharge time > 30 minutes    Pertinent Physical Exam At Time of Discharge  Physical Exam  HENT:      Mouth/Throat:      Mouth: Mucous membranes are moist.   Eyes:      Extraocular Movements: Extraocular movements intact.   Cardiovascular:      Rate and Rhythm: Normal rate and regular rhythm.      Pulses: Normal pulses.      Heart sounds: Normal heart sounds.   Pulmonary:      Effort: Pulmonary effort is normal.      Breath sounds: Normal breath sounds.   Abdominal:      General: Abdomen is flat.      Palpations: Abdomen is soft.   Musculoskeletal:      Left lower leg: Edema present.   Skin:     General: Skin is warm and dry.   Neurological:       Mental Status: She is alert and oriented to person, place, and time.   Psychiatric:         Mood and Affect: Mood normal.         Behavior: Behavior normal.         Thought Content: Thought content normal.         Judgment: Judgment normal.     Outpatient Follow-Up  No future appointments.      Jacqueline Garcia, LUCIA-CNP

## 2025-06-25 LAB
ATRIAL RATE: 80 BPM
P AXIS: 63 DEGREES
P OFFSET: 177 MS
P ONSET: 131 MS
PR INTERVAL: 156 MS
Q ONSET: 209 MS
QRS COUNT: 14 BEATS
QRS DURATION: 108 MS
QT INTERVAL: 398 MS
QTC CALCULATION(BAZETT): 459 MS
QTC FREDERICIA: 438 MS
R AXIS: -54 DEGREES
T AXIS: 44 DEGREES
T OFFSET: 408 MS
VENTRICULAR RATE: 80 BPM
